# Patient Record
Sex: FEMALE | Race: OTHER | HISPANIC OR LATINO
[De-identification: names, ages, dates, MRNs, and addresses within clinical notes are randomized per-mention and may not be internally consistent; named-entity substitution may affect disease eponyms.]

---

## 2017-08-09 ENCOUNTER — TRANSCRIPTION ENCOUNTER (OUTPATIENT)
Age: 27
End: 2017-08-09

## 2017-08-13 ENCOUNTER — TRANSCRIPTION ENCOUNTER (OUTPATIENT)
Age: 27
End: 2017-08-13

## 2018-05-02 ENCOUNTER — TRANSCRIPTION ENCOUNTER (OUTPATIENT)
Age: 28
End: 2018-05-02

## 2018-11-19 ENCOUNTER — RESULT REVIEW (OUTPATIENT)
Age: 28
End: 2018-11-19

## 2019-10-04 ENCOUNTER — TRANSCRIPTION ENCOUNTER (OUTPATIENT)
Age: 29
End: 2019-10-04

## 2020-04-25 ENCOUNTER — MESSAGE (OUTPATIENT)
Age: 30
End: 2020-04-25

## 2020-05-04 LAB
SARS-COV-2 IGG SERPL IA-ACNC: 0 INDEX
SARS-COV-2 IGG SERPL QL IA: NEGATIVE

## 2020-07-09 ENCOUNTER — RESULT REVIEW (OUTPATIENT)
Age: 30
End: 2020-07-09

## 2020-09-29 ENCOUNTER — ASOB RESULT (OUTPATIENT)
Age: 30
End: 2020-09-29

## 2020-09-29 ENCOUNTER — APPOINTMENT (OUTPATIENT)
Dept: ANTEPARTUM | Facility: CLINIC | Age: 30
End: 2020-09-29
Payer: COMMERCIAL

## 2020-09-29 PROCEDURE — 76811 OB US DETAILED SNGL FETUS: CPT

## 2020-09-29 PROCEDURE — 76817 TRANSVAGINAL US OBSTETRIC: CPT

## 2021-01-28 ENCOUNTER — OUTPATIENT (OUTPATIENT)
Dept: OUTPATIENT SERVICES | Facility: HOSPITAL | Age: 31
LOS: 1 days | End: 2021-01-28
Payer: COMMERCIAL

## 2021-01-28 VITALS
OXYGEN SATURATION: 97 % | RESPIRATION RATE: 20 BRPM | DIASTOLIC BLOOD PRESSURE: 84 MMHG | WEIGHT: 147.93 LBS | HEART RATE: 64 BPM | HEIGHT: 60 IN | SYSTOLIC BLOOD PRESSURE: 122 MMHG | TEMPERATURE: 99 F

## 2021-01-28 DIAGNOSIS — K08.492 PARTIAL LOSS OF TEETH DUE TO OTHER SPECIFIED CAUSE, CLASS II: Chronic | ICD-10-CM

## 2021-01-28 DIAGNOSIS — Z29.9 ENCOUNTER FOR PROPHYLACTIC MEASURES, UNSPECIFIED: ICD-10-CM

## 2021-01-28 DIAGNOSIS — Z3A.37 37 WEEKS GESTATION OF PREGNANCY: ICD-10-CM

## 2021-01-28 LAB
BLD GP AB SCN SERPL QL: NEGATIVE — SIGNIFICANT CHANGE UP
HCT VFR BLD CALC: 42 % — SIGNIFICANT CHANGE UP (ref 34.5–45)
HGB BLD-MCNC: 13.8 G/DL — SIGNIFICANT CHANGE UP (ref 11.5–15.5)
MCHC RBC-ENTMCNC: 30 PG — SIGNIFICANT CHANGE UP (ref 27–34)
MCHC RBC-ENTMCNC: 32.9 GM/DL — SIGNIFICANT CHANGE UP (ref 32–36)
MCV RBC AUTO: 91.3 FL — SIGNIFICANT CHANGE UP (ref 80–100)
NRBC # BLD: 0 /100 WBCS — SIGNIFICANT CHANGE UP (ref 0–0)
PLATELET # BLD AUTO: 250 K/UL — SIGNIFICANT CHANGE UP (ref 150–400)
RBC # BLD: 4.6 M/UL — SIGNIFICANT CHANGE UP (ref 3.8–5.2)
RBC # FLD: 13.5 % — SIGNIFICANT CHANGE UP (ref 10.3–14.5)
RH IG SCN BLD-IMP: POSITIVE — SIGNIFICANT CHANGE UP
WBC # BLD: 12.04 K/UL — HIGH (ref 3.8–10.5)
WBC # FLD AUTO: 12.04 K/UL — HIGH (ref 3.8–10.5)

## 2021-01-28 PROCEDURE — 86850 RBC ANTIBODY SCREEN: CPT

## 2021-01-28 PROCEDURE — G0463: CPT

## 2021-01-28 PROCEDURE — 86900 BLOOD TYPING SEROLOGIC ABO: CPT

## 2021-01-28 PROCEDURE — 86901 BLOOD TYPING SEROLOGIC RH(D): CPT

## 2021-01-28 PROCEDURE — 85027 COMPLETE CBC AUTOMATED: CPT

## 2021-01-28 NOTE — OB PST NOTE - ASSESSMENT
CAPRINI SCORE [CLOT updated 18]    AGE RELATED RISK FACTORS                                                       MOBILITY RELATED FACTORS  [ ] Age 41-60 years                                            (1 Point)                    [ ] Bed rest                                                        (1 Point)  [ ] Age: 61-74 years                                           (2 Points)                  [ ] Plaster cast                                                   (2 Points)  [ ] Age= 75 years                                              (3 Points)                    [ ] Bed bound for more than 72 hours                 (2 Points)    DISEASE RELATED RISK FACTORS                                               GENDER SPECIFIC FACTORS  [ ] Edema in the lower extremities                       (1 Point)              [ 1] Pregnancy                                                     (1 Point)  [ ] Varicose veins                                               (1 Point)                     [ ] Post-partum < 6 weeks                                   (1 Point)             [ 1] BMI > 25 Kg/m2                                            (1 Point)                     [ ] Hormonal therapy  or oral contraception          (1 Point)                 [ ] Sepsis (in the previous month)                        (1 Point)               [ ] History of pregnancy complications                 (1 point)  [ ] Pneumonia or serious lung disease                                               [ ] Unexplained or recurrent                     (1 Point)           (in the previous month)                               (1 Point)  [ ] Abnormal pulmonary function test                     (1 Point)                 SURGERY RELATED RISK FACTORS  [ ] Acute myocardial infarction                              (1 Point)               [1 ]  Section                                             (1 Point)  [ ] Congestive heart failure (in the previous month)  (1 Point)      [ ] Minor surgery                                                  (1 Point)   [ ] Inflammatory bowel disease                             (1 Point)               [ ] Arthroscopic surgery                                        (2 Points)  [ ] Central venous access                                      (2 Points)                [ ] General surgery lasting more than 45 minutes (2 points)  [ ] Present or previous malignancy                     (2 Points)                [ ] Elective arthroplasty                                         (5 points)    [ ] Stroke (in the previous month)                          (5 Points)                                                                                                                                                           HEMATOLOGY RELATED FACTORS                                                 TRAUMA RELATED RISK FACTORS  [ ] Prior episodes of VTE                                     (3 Points)                [ ] Fracture of the hip, pelvis, or leg                       (5 Points)  [ ] Positive family history for VTE                         (3 Points)             [ ] Acute spinal cord injury (in the previous month)  (5 Points)  [ ] Prothrombin 59460 A                                     (3 Points)               [ ] Paralysis  (less than 1 month)                             (5 Points)  [ ] Factor V Leiden                                             (3 Points)                  [ ] Multiple Trauma within 1 month                        (5 Points)  [ ] Lupus anticoagulants                                     (3 Points)                                                           [ ] Anticardiolipin antibodies                               (3 Points)                                                       [ ] High homocysteine in the blood                      (3 Points)                                             [ ] Other congenital or acquired thrombophilia      (3 Points)                                                [ ] Heparin induced thrombocytopenia                  (3 Points)                                     Total Score [3 ]

## 2021-01-28 NOTE — OB PST NOTE - PMH
37 weeks gestation of pregnancy    Mild asthma without complication, unspecified whether persistent  childhood asthma   no previous intubation for asthma   has not used an inhaler since childhood  Scoliosis of lumbosacral spine, unspecified scoliosis type  mild

## 2021-01-28 NOTE — OB PST NOTE - HISTORY OF PRESENT ILLNESS
30 year old female , is being seen in preadmission today prior to scheduled  on 2021. Her medical history includes childhood asthma (no inhaler), mild scoliosis Patient is 37 weeks gestation, last abdominal sonogram on 2021.     Covid-19 swab on 2021

## 2021-01-28 NOTE — OB PST NOTE - FAMILY HISTORY
Mother  Still living? Yes, Estimated age: 61-70  Family history of hypertension in mother, Age at diagnosis: Age Unknown     Father  Still living? No  Family history of hypertension in father, Age at diagnosis: Age Unknown

## 2021-01-28 NOTE — OB PST NOTE - PROBLEM SELECTOR PLAN 2
scheduled for    both verbal and written preop instruction given  advise to practice social distancing, good handwashing and mask wearing   chlorhexidine wash to be used per protocol

## 2021-02-08 ENCOUNTER — TRANSCRIPTION ENCOUNTER (OUTPATIENT)
Age: 31
End: 2021-02-08

## 2021-02-09 ENCOUNTER — INPATIENT (INPATIENT)
Facility: HOSPITAL | Age: 31
LOS: 1 days | Discharge: HOME | End: 2021-02-11
Attending: OBSTETRICS & GYNECOLOGY | Admitting: OBSTETRICS & GYNECOLOGY
Payer: COMMERCIAL

## 2021-02-09 VITALS
WEIGHT: 149.03 LBS | TEMPERATURE: 98 F | OXYGEN SATURATION: 99 % | DIASTOLIC BLOOD PRESSURE: 83 MMHG | HEART RATE: 65 BPM | HEIGHT: 60 IN | SYSTOLIC BLOOD PRESSURE: 137 MMHG | RESPIRATION RATE: 18 BRPM

## 2021-02-09 DIAGNOSIS — O26.899 OTHER SPECIFIED PREGNANCY RELATED CONDITIONS, UNSPECIFIED TRIMESTER: ICD-10-CM

## 2021-02-09 DIAGNOSIS — Z3A.00 WEEKS OF GESTATION OF PREGNANCY NOT SPECIFIED: ICD-10-CM

## 2021-02-09 DIAGNOSIS — Z34.80 ENCOUNTER FOR SUPERVISION OF OTHER NORMAL PREGNANCY, UNSPECIFIED TRIMESTER: ICD-10-CM

## 2021-02-09 DIAGNOSIS — K08.492 PARTIAL LOSS OF TEETH DUE TO OTHER SPECIFIED CAUSE, CLASS II: Chronic | ICD-10-CM

## 2021-02-09 PROBLEM — M41.9 SCOLIOSIS, UNSPECIFIED: Chronic | Status: ACTIVE | Noted: 2021-01-28

## 2021-02-09 PROBLEM — J45.909 UNSPECIFIED ASTHMA, UNCOMPLICATED: Chronic | Status: ACTIVE | Noted: 2021-01-28

## 2021-02-09 PROBLEM — Z3A.37 37 WEEKS GESTATION OF PREGNANCY: Chronic | Status: ACTIVE | Noted: 2021-01-28

## 2021-02-09 LAB
BASOPHILS # BLD AUTO: 0.06 K/UL — SIGNIFICANT CHANGE UP (ref 0–0.2)
BASOPHILS NFR BLD AUTO: 0.5 % — SIGNIFICANT CHANGE UP (ref 0–2)
BLD GP AB SCN SERPL QL: NEGATIVE — SIGNIFICANT CHANGE UP
EOSINOPHIL # BLD AUTO: 0.06 K/UL — SIGNIFICANT CHANGE UP (ref 0–0.5)
EOSINOPHIL NFR BLD AUTO: 0.5 % — SIGNIFICANT CHANGE UP (ref 0–6)
HCT VFR BLD CALC: 43.5 % — SIGNIFICANT CHANGE UP (ref 34.5–45)
HGB BLD-MCNC: 14.8 G/DL — SIGNIFICANT CHANGE UP (ref 11.5–15.5)
IMM GRANULOCYTES NFR BLD AUTO: 0.7 % — SIGNIFICANT CHANGE UP (ref 0–1.5)
LYMPHOCYTES # BLD AUTO: 26.1 % — SIGNIFICANT CHANGE UP (ref 13–44)
LYMPHOCYTES # BLD AUTO: 3.24 K/UL — SIGNIFICANT CHANGE UP (ref 1–3.3)
MCHC RBC-ENTMCNC: 31 PG — SIGNIFICANT CHANGE UP (ref 27–34)
MCHC RBC-ENTMCNC: 34 GM/DL — SIGNIFICANT CHANGE UP (ref 32–36)
MCV RBC AUTO: 91 FL — SIGNIFICANT CHANGE UP (ref 80–100)
MONOCYTES # BLD AUTO: 0.52 K/UL — SIGNIFICANT CHANGE UP (ref 0–0.9)
MONOCYTES NFR BLD AUTO: 4.2 % — SIGNIFICANT CHANGE UP (ref 2–14)
NEUTROPHILS # BLD AUTO: 8.45 K/UL — HIGH (ref 1.8–7.4)
NEUTROPHILS NFR BLD AUTO: 68 % — SIGNIFICANT CHANGE UP (ref 43–77)
NRBC # BLD: 0 /100 WBCS — SIGNIFICANT CHANGE UP (ref 0–0)
PLATELET # BLD AUTO: 249 K/UL — SIGNIFICANT CHANGE UP (ref 150–400)
RBC # BLD: 4.78 M/UL — SIGNIFICANT CHANGE UP (ref 3.8–5.2)
RBC # FLD: 13.8 % — SIGNIFICANT CHANGE UP (ref 10.3–14.5)
RH IG SCN BLD-IMP: POSITIVE — SIGNIFICANT CHANGE UP
SARS-COV-2 RNA SPEC QL NAA+PROBE: SIGNIFICANT CHANGE UP
WBC # BLD: 12.42 K/UL — HIGH (ref 3.8–10.5)
WBC # FLD AUTO: 12.42 K/UL — HIGH (ref 3.8–10.5)

## 2021-02-09 PROCEDURE — 88307 TISSUE EXAM BY PATHOLOGIST: CPT | Mod: 26

## 2021-02-09 RX ORDER — IBUPROFEN 200 MG
600 TABLET ORAL EVERY 6 HOURS
Refills: 0 | Status: COMPLETED | OUTPATIENT
Start: 2021-02-09 | End: 2022-01-08

## 2021-02-09 RX ORDER — DIPHENHYDRAMINE HCL 50 MG
25 CAPSULE ORAL EVERY 6 HOURS
Refills: 0 | Status: DISCONTINUED | OUTPATIENT
Start: 2021-02-09 | End: 2021-02-11

## 2021-02-09 RX ORDER — METOCLOPRAMIDE HCL 10 MG
10 TABLET ORAL ONCE
Refills: 0 | Status: DISCONTINUED | OUTPATIENT
Start: 2021-02-09 | End: 2021-02-10

## 2021-02-09 RX ORDER — FAMOTIDINE 10 MG/ML
20 INJECTION INTRAVENOUS ONCE
Refills: 0 | Status: COMPLETED | OUTPATIENT
Start: 2021-02-09 | End: 2021-02-09

## 2021-02-09 RX ORDER — MAGNESIUM HYDROXIDE 400 MG/1
30 TABLET, CHEWABLE ORAL
Refills: 0 | Status: DISCONTINUED | OUTPATIENT
Start: 2021-02-09 | End: 2021-02-11

## 2021-02-09 RX ORDER — ONDANSETRON 8 MG/1
4 TABLET, FILM COATED ORAL EVERY 6 HOURS
Refills: 0 | Status: DISCONTINUED | OUTPATIENT
Start: 2021-02-09 | End: 2021-02-11

## 2021-02-09 RX ORDER — OXYCODONE HYDROCHLORIDE 5 MG/1
5 TABLET ORAL ONCE
Refills: 0 | Status: DISCONTINUED | OUTPATIENT
Start: 2021-02-09 | End: 2021-02-11

## 2021-02-09 RX ORDER — MORPHINE SULFATE 50 MG/1
0.1 CAPSULE, EXTENDED RELEASE ORAL ONCE
Refills: 0 | Status: DISCONTINUED | OUTPATIENT
Start: 2021-02-09 | End: 2021-02-10

## 2021-02-09 RX ORDER — DEXAMETHASONE 0.5 MG/5ML
4 ELIXIR ORAL EVERY 6 HOURS
Refills: 0 | Status: DISCONTINUED | OUTPATIENT
Start: 2021-02-09 | End: 2021-02-11

## 2021-02-09 RX ORDER — HEPARIN SODIUM 5000 [USP'U]/ML
5000 INJECTION INTRAVENOUS; SUBCUTANEOUS EVERY 12 HOURS
Refills: 0 | Status: DISCONTINUED | OUTPATIENT
Start: 2021-02-09 | End: 2021-02-11

## 2021-02-09 RX ORDER — NALOXONE HYDROCHLORIDE 4 MG/.1ML
0.1 SPRAY NASAL
Refills: 0 | Status: DISCONTINUED | OUTPATIENT
Start: 2021-02-09 | End: 2021-02-11

## 2021-02-09 RX ORDER — OXYCODONE HYDROCHLORIDE 5 MG/1
5 TABLET ORAL
Refills: 0 | Status: DISCONTINUED | OUTPATIENT
Start: 2021-02-09 | End: 2021-02-10

## 2021-02-09 RX ORDER — OXYCODONE HYDROCHLORIDE 5 MG/1
10 TABLET ORAL
Refills: 0 | Status: DISCONTINUED | OUTPATIENT
Start: 2021-02-09 | End: 2021-02-09

## 2021-02-09 RX ORDER — OXYTOCIN 10 UNIT/ML
333.33 VIAL (ML) INJECTION
Qty: 20 | Refills: 0 | Status: DISCONTINUED | OUTPATIENT
Start: 2021-02-09 | End: 2021-02-11

## 2021-02-09 RX ORDER — OXYCODONE HYDROCHLORIDE 5 MG/1
5 TABLET ORAL
Refills: 0 | Status: COMPLETED | OUTPATIENT
Start: 2021-02-09 | End: 2021-02-16

## 2021-02-09 RX ORDER — CITRIC ACID/SODIUM CITRATE 300-500 MG
30 SOLUTION, ORAL ORAL ONCE
Refills: 0 | Status: COMPLETED | OUTPATIENT
Start: 2021-02-09 | End: 2021-02-09

## 2021-02-09 RX ORDER — SODIUM CHLORIDE 9 MG/ML
1000 INJECTION, SOLUTION INTRAVENOUS
Refills: 0 | Status: DISCONTINUED | OUTPATIENT
Start: 2021-02-09 | End: 2021-02-09

## 2021-02-09 RX ORDER — SIMETHICONE 80 MG/1
80 TABLET, CHEWABLE ORAL EVERY 4 HOURS
Refills: 0 | Status: DISCONTINUED | OUTPATIENT
Start: 2021-02-09 | End: 2021-02-11

## 2021-02-09 RX ORDER — TETANUS TOXOID, REDUCED DIPHTHERIA TOXOID AND ACELLULAR PERTUSSIS VACCINE, ADSORBED 5; 2.5; 8; 8; 2.5 [IU]/.5ML; [IU]/.5ML; UG/.5ML; UG/.5ML; UG/.5ML
0.5 SUSPENSION INTRAMUSCULAR ONCE
Refills: 0 | Status: DISCONTINUED | OUTPATIENT
Start: 2021-02-09 | End: 2021-02-11

## 2021-02-09 RX ORDER — KETOROLAC TROMETHAMINE 30 MG/ML
30 SYRINGE (ML) INJECTION EVERY 6 HOURS
Refills: 0 | Status: DISCONTINUED | OUTPATIENT
Start: 2021-02-09 | End: 2021-02-10

## 2021-02-09 RX ORDER — SODIUM CHLORIDE 9 MG/ML
1000 INJECTION, SOLUTION INTRAVENOUS ONCE
Refills: 0 | Status: COMPLETED | OUTPATIENT
Start: 2021-02-09 | End: 2021-02-09

## 2021-02-09 RX ORDER — SODIUM CHLORIDE 9 MG/ML
1000 INJECTION, SOLUTION INTRAVENOUS
Refills: 0 | Status: DISCONTINUED | OUTPATIENT
Start: 2021-02-09 | End: 2021-02-11

## 2021-02-09 RX ORDER — OXYTOCIN 10 UNIT/ML
333.33 VIAL (ML) INJECTION
Qty: 20 | Refills: 0 | Status: COMPLETED | OUTPATIENT
Start: 2021-02-09 | End: 2021-02-09

## 2021-02-09 RX ORDER — ACETAMINOPHEN 500 MG
975 TABLET ORAL
Refills: 0 | Status: DISCONTINUED | OUTPATIENT
Start: 2021-02-09 | End: 2021-02-11

## 2021-02-09 RX ORDER — LANOLIN
1 OINTMENT (GRAM) TOPICAL EVERY 6 HOURS
Refills: 0 | Status: DISCONTINUED | OUTPATIENT
Start: 2021-02-09 | End: 2021-02-11

## 2021-02-09 RX ADMIN — SODIUM CHLORIDE 2000 MILLILITER(S): 9 INJECTION, SOLUTION INTRAVENOUS at 16:24

## 2021-02-09 RX ADMIN — Medication 1000 MILLIUNIT(S)/MIN: at 21:26

## 2021-02-09 RX ADMIN — Medication 975 MILLIGRAM(S): at 23:23

## 2021-02-09 RX ADMIN — FAMOTIDINE 20 MILLIGRAM(S): 10 INJECTION INTRAVENOUS at 19:18

## 2021-02-09 RX ADMIN — Medication 1000 MILLIUNIT(S)/MIN: at 20:50

## 2021-02-09 RX ADMIN — Medication 30 MILLIGRAM(S): at 20:35

## 2021-02-09 RX ADMIN — Medication 30 MILLILITER(S): at 19:18

## 2021-02-09 NOTE — OB PROVIDER H&P - ASSESSMENT
A/P: 31 yo  @ 38w5d presents for repeat c/s due to vaginal bleeding in setting of accessory placental lobe- not actively bleeding currently  - admit to L&D  - routine labs  - NPO  - IV hydration  - fetus: reactive NST  - anesthesia consult  - COVID swab collected      Dr Xena Bermudez, PAC

## 2021-02-09 NOTE — OB PROVIDER H&P - ATTENDING COMMENTS
Attending note    No more vaginal bleeding. +FM. NO LOF. Minor cramping. Here for CS for vaginal bleeding today. patient was told to come right from office appointment. Did not show up to hospital until the afternoon.     EFM: Cat I  TOCO: intermittent cramping    Plan:   For section  Routine labs  IVF  EFM/TOCO  Anesthesia consults  Consents signed- risks reviewed including bleeding, infection, damage to surrounding structures including but not limited to bowel, bladder and ureter. All questions answered    Elysia Mccallum DO

## 2021-02-09 NOTE — OB PROVIDER DELIVERY SUMMARY - NSPROVIDERDELIVERYNOTE_OBGYN_ALL_OB_FT
Live female infant, apgars 8/9, weight  Thin adhesions of bladder to anterior uterus  Omental adhesion to right side of peritoneum/muscle  Grossly normal appearing uterus, fallopian tubes and ovaries    IVF 1600   Live female infant, apgars 8/9, weight  Omental adhesion to right side of peritoneum/muscle  Grossly normal appearing uterus, fallopian tubes and ovaries    IVF 1600   Live female infant, apgars 8/9, weight 2636g  Omental adhesion to right side of peritoneum/muscle  Grossly normal appearing uterus, fallopian tubes and ovaries    IVF 1600

## 2021-02-09 NOTE — OB NEONATOLOGY/PEDIATRICIAN DELIVERY SUMMARY - NSPEDSNEONOTESA_OBGYN_ALL_OB_FT
38+5 wk female born via CS to a 29 y/o  blood type A+ mother. Maternal history of asthma and placental abruption (2015). No significant prenatal history. PNL -/-/NR/I, GBS -. AROM at  with clear fluids. Baby emerged vigorous, crying, was w/d/s/s with APGARS of 8/9. At approximately 3.5 minutes of life, O2 saturation was in the mid-60% range. Respiratory effort was fair. Patient was placed on CPAP 5/21% for approximately 1 minute. Max FiO2 50%. After stopping CPAP, O2 saturations and respiratory effort remained acceptable. Mom plans to initiate breastfeeding and consents Hep B vaccine. EOS 0.03.

## 2021-02-09 NOTE — OB PROVIDER H&P - HISTORY OF PRESENT ILLNESS
OB PA Admission Note    31 yo  @ 38w5d by EDC of  presents from office for repeat c/s secondary to vaginal bleeding in setting of accessory placental lobe. Pt reports she had soem vaginal bleeding this AM and very minimal spotting since. Pt reports mild occasional cramping.  +FM No LOF    PNC: accessory placental lobe  GBS neg  EFW [id number] //    PMH: childhood asthma (exacerbated by animals)  Meds: PNV  All: NKA  GYN: denies fibroids/cysts/STI/abn pap  OB: 5[birth year]  c/s @ 36 weeks secondary to placental tear [id number]  PSH: c/s x 1, wisdom teeth extraction  Social: denies toxic habits  Psych: denies history

## 2021-02-09 NOTE — OB PROVIDER H&P - NSHPPHYSICALEXAM_GEN_ALL_CORE
ICU Vital Signs Last 24 Hrs  T(C): 36.8 (09 Feb 2021 14:54), Max: 36.8 (09 Feb 2021 14:52)  T(F): 98.24 (09 Feb 2021 14:54), Max: 98.24 (09 Feb 2021 14:54)  HR: 59 (09 Feb 2021 15:29) (59 - 74)  BP: 137/83 (09 Feb 2021 14:54) (137/83 - 137/83)  BP(mean): --  ABP: --  ABP(mean): --  RR: 18 (09 Feb 2021 14:54) (18 - 18)  SpO2: 98% (09 Feb 2021 15:29) (97% - 100%)      gen: NAD  Heart: S1S2 RRR  Lungs: CTA b/l  Abd: gravid nontender  LE: no calf tenderness    FHT: reactive   Sublette: irregular

## 2021-02-09 NOTE — OB RN DELIVERY SUMMARY - NS_SEPSISRSKCALC_OBGYN_ALL_OB_FT
EOS calculated successfully. EOS Risk Factor: 0.5/1000 live births ([facility] national incidence); GA=38w5d; Temp=98.24; ROM=0; GBS='Negative'; Antibiotics='No antibiotics or any antibiotics < 2 hrs prior to birth'

## 2021-02-10 LAB
ALBUMIN SERPL ELPH-MCNC: 3.2 G/DL — LOW (ref 3.3–5)
ALP SERPL-CCNC: 146 U/L — HIGH (ref 40–120)
ALT FLD-CCNC: 13 U/L — SIGNIFICANT CHANGE UP (ref 10–45)
ANION GAP SERPL CALC-SCNC: 9 MMOL/L — SIGNIFICANT CHANGE UP (ref 5–17)
APTT BLD: 26.4 SEC — LOW (ref 27.5–35.5)
AST SERPL-CCNC: 21 U/L — SIGNIFICANT CHANGE UP (ref 10–40)
BASOPHILS # BLD AUTO: 0 K/UL — SIGNIFICANT CHANGE UP (ref 0–0.2)
BASOPHILS # BLD AUTO: 0.03 K/UL — SIGNIFICANT CHANGE UP (ref 0–0.2)
BASOPHILS NFR BLD AUTO: 0 % — SIGNIFICANT CHANGE UP (ref 0–2)
BASOPHILS NFR BLD AUTO: 0.2 % — SIGNIFICANT CHANGE UP (ref 0–2)
BILIRUB SERPL-MCNC: 0.3 MG/DL — SIGNIFICANT CHANGE UP (ref 0.2–1.2)
BUN SERPL-MCNC: 10 MG/DL — SIGNIFICANT CHANGE UP (ref 7–23)
CALCIUM SERPL-MCNC: 8.8 MG/DL — SIGNIFICANT CHANGE UP (ref 8.4–10.5)
CHLORIDE SERPL-SCNC: 100 MMOL/L — SIGNIFICANT CHANGE UP (ref 96–108)
CO2 SERPL-SCNC: 24 MMOL/L — SIGNIFICANT CHANGE UP (ref 22–31)
CREAT SERPL-MCNC: 0.67 MG/DL — SIGNIFICANT CHANGE UP (ref 0.5–1.3)
EOSINOPHIL # BLD AUTO: 0 K/UL — SIGNIFICANT CHANGE UP (ref 0–0.5)
EOSINOPHIL # BLD AUTO: 0.02 K/UL — SIGNIFICANT CHANGE UP (ref 0–0.5)
EOSINOPHIL NFR BLD AUTO: 0 % — SIGNIFICANT CHANGE UP (ref 0–6)
EOSINOPHIL NFR BLD AUTO: 0.1 % — SIGNIFICANT CHANGE UP (ref 0–6)
FIBRINOGEN PPP-MCNC: 652 MG/DL — HIGH (ref 290–520)
GLUCOSE SERPL-MCNC: 82 MG/DL — SIGNIFICANT CHANGE UP (ref 70–99)
HCT VFR BLD CALC: 37.4 % — SIGNIFICANT CHANGE UP (ref 34.5–45)
HCT VFR BLD CALC: 42 % — SIGNIFICANT CHANGE UP (ref 34.5–45)
HGB BLD-MCNC: 12.6 G/DL — SIGNIFICANT CHANGE UP (ref 11.5–15.5)
HGB BLD-MCNC: 14.1 G/DL — SIGNIFICANT CHANGE UP (ref 11.5–15.5)
IMM GRANULOCYTES NFR BLD AUTO: 0.6 % — SIGNIFICANT CHANGE UP (ref 0–1.5)
INR BLD: 0.9 RATIO — SIGNIFICANT CHANGE UP (ref 0.88–1.16)
LDH SERPL L TO P-CCNC: 233 U/L — SIGNIFICANT CHANGE UP (ref 50–242)
LYMPHOCYTES # BLD AUTO: 0.35 K/UL — LOW (ref 1–3.3)
LYMPHOCYTES # BLD AUTO: 1.7 % — LOW (ref 13–44)
LYMPHOCYTES # BLD AUTO: 1.92 K/UL — SIGNIFICANT CHANGE UP (ref 1–3.3)
LYMPHOCYTES # BLD AUTO: 10.8 % — LOW (ref 13–44)
MCHC RBC-ENTMCNC: 30.7 PG — SIGNIFICANT CHANGE UP (ref 27–34)
MCHC RBC-ENTMCNC: 30.9 PG — SIGNIFICANT CHANGE UP (ref 27–34)
MCHC RBC-ENTMCNC: 33.6 GM/DL — SIGNIFICANT CHANGE UP (ref 32–36)
MCHC RBC-ENTMCNC: 33.7 GM/DL — SIGNIFICANT CHANGE UP (ref 32–36)
MCV RBC AUTO: 91.5 FL — SIGNIFICANT CHANGE UP (ref 80–100)
MCV RBC AUTO: 91.7 FL — SIGNIFICANT CHANGE UP (ref 80–100)
MONOCYTES # BLD AUTO: 0.54 K/UL — SIGNIFICANT CHANGE UP (ref 0–0.9)
MONOCYTES # BLD AUTO: 0.82 K/UL — SIGNIFICANT CHANGE UP (ref 0–0.9)
MONOCYTES NFR BLD AUTO: 2.6 % — SIGNIFICANT CHANGE UP (ref 2–14)
MONOCYTES NFR BLD AUTO: 4.6 % — SIGNIFICANT CHANGE UP (ref 2–14)
NEUTROPHILS # BLD AUTO: 14.97 K/UL — HIGH (ref 1.8–7.4)
NEUTROPHILS # BLD AUTO: 19.85 K/UL — HIGH (ref 1.8–7.4)
NEUTROPHILS NFR BLD AUTO: 83.7 % — HIGH (ref 43–77)
NEUTROPHILS NFR BLD AUTO: 95.7 % — HIGH (ref 43–77)
NRBC # BLD: 0 /100 WBCS — SIGNIFICANT CHANGE UP (ref 0–0)
PLATELET # BLD AUTO: 203 K/UL — SIGNIFICANT CHANGE UP (ref 150–400)
PLATELET # BLD AUTO: 232 K/UL — SIGNIFICANT CHANGE UP (ref 150–400)
POTASSIUM SERPL-MCNC: 4.4 MMOL/L — SIGNIFICANT CHANGE UP (ref 3.5–5.3)
POTASSIUM SERPL-SCNC: 4.4 MMOL/L — SIGNIFICANT CHANGE UP (ref 3.5–5.3)
PROT SERPL-MCNC: 6.2 G/DL — SIGNIFICANT CHANGE UP (ref 6–8.3)
PROTHROM AB SERPL-ACNC: 10.8 SEC — SIGNIFICANT CHANGE UP (ref 10.6–13.6)
RBC # BLD: 4.08 M/UL — SIGNIFICANT CHANGE UP (ref 3.8–5.2)
RBC # BLD: 4.59 M/UL — SIGNIFICANT CHANGE UP (ref 3.8–5.2)
RBC # FLD: 13.6 % — SIGNIFICANT CHANGE UP (ref 10.3–14.5)
RBC # FLD: 13.6 % — SIGNIFICANT CHANGE UP (ref 10.3–14.5)
SARS-COV-2 IGG SERPL QL IA: NEGATIVE — SIGNIFICANT CHANGE UP
SARS-COV-2 IGM SERPL IA-ACNC: <0.1 INDEX — SIGNIFICANT CHANGE UP
SODIUM SERPL-SCNC: 133 MMOL/L — LOW (ref 135–145)
T PALLIDUM AB TITR SER: NEGATIVE — SIGNIFICANT CHANGE UP
URATE SERPL-MCNC: 4.6 MG/DL — SIGNIFICANT CHANGE UP (ref 2.5–7)
WBC # BLD: 17.86 K/UL — HIGH (ref 3.8–10.5)
WBC # BLD: 20.74 K/UL — HIGH (ref 3.8–10.5)
WBC # FLD AUTO: 17.86 K/UL — HIGH (ref 3.8–10.5)
WBC # FLD AUTO: 20.74 K/UL — HIGH (ref 3.8–10.5)

## 2021-02-10 RX ORDER — IBUPROFEN 200 MG
600 TABLET ORAL EVERY 6 HOURS
Refills: 0 | Status: DISCONTINUED | OUTPATIENT
Start: 2021-02-10 | End: 2021-02-11

## 2021-02-10 RX ORDER — KETOROLAC TROMETHAMINE 30 MG/ML
30 SYRINGE (ML) INJECTION ONCE
Refills: 0 | Status: DISCONTINUED | OUTPATIENT
Start: 2021-02-10 | End: 2021-02-10

## 2021-02-10 RX ADMIN — Medication 30 MILLIGRAM(S): at 15:33

## 2021-02-10 RX ADMIN — HEPARIN SODIUM 5000 UNIT(S): 5000 INJECTION INTRAVENOUS; SUBCUTANEOUS at 05:21

## 2021-02-10 RX ADMIN — Medication 975 MILLIGRAM(S): at 18:33

## 2021-02-10 RX ADMIN — OXYCODONE HYDROCHLORIDE 5 MILLIGRAM(S): 5 TABLET ORAL at 01:03

## 2021-02-10 RX ADMIN — Medication 30 MILLIGRAM(S): at 09:31

## 2021-02-10 RX ADMIN — Medication 975 MILLIGRAM(S): at 05:21

## 2021-02-10 RX ADMIN — HEPARIN SODIUM 5000 UNIT(S): 5000 INJECTION INTRAVENOUS; SUBCUTANEOUS at 18:34

## 2021-02-10 RX ADMIN — Medication 30 MILLIGRAM(S): at 03:08

## 2021-02-10 RX ADMIN — Medication 975 MILLIGRAM(S): at 12:38

## 2021-02-10 RX ADMIN — Medication 600 MILLIGRAM(S): at 20:56

## 2021-02-11 ENCOUNTER — TRANSCRIPTION ENCOUNTER (OUTPATIENT)
Age: 31
End: 2021-02-11

## 2021-02-11 VITALS
HEART RATE: 58 BPM | TEMPERATURE: 98 F | OXYGEN SATURATION: 96 % | RESPIRATION RATE: 18 BRPM | DIASTOLIC BLOOD PRESSURE: 66 MMHG | SYSTOLIC BLOOD PRESSURE: 116 MMHG

## 2021-02-11 PROCEDURE — 84550 ASSAY OF BLOOD/URIC ACID: CPT

## 2021-02-11 PROCEDURE — 59050 FETAL MONITOR W/REPORT: CPT

## 2021-02-11 PROCEDURE — 86769 SARS-COV-2 COVID-19 ANTIBODY: CPT

## 2021-02-11 PROCEDURE — G0463: CPT

## 2021-02-11 PROCEDURE — 85610 PROTHROMBIN TIME: CPT

## 2021-02-11 PROCEDURE — 88307 TISSUE EXAM BY PATHOLOGIST: CPT

## 2021-02-11 PROCEDURE — 85730 THROMBOPLASTIN TIME PARTIAL: CPT

## 2021-02-11 PROCEDURE — 80053 COMPREHEN METABOLIC PANEL: CPT

## 2021-02-11 PROCEDURE — 86850 RBC ANTIBODY SCREEN: CPT

## 2021-02-11 PROCEDURE — 86901 BLOOD TYPING SEROLOGIC RH(D): CPT

## 2021-02-11 PROCEDURE — 86900 BLOOD TYPING SEROLOGIC ABO: CPT

## 2021-02-11 PROCEDURE — 85384 FIBRINOGEN ACTIVITY: CPT

## 2021-02-11 PROCEDURE — 85025 COMPLETE CBC W/AUTO DIFF WBC: CPT

## 2021-02-11 PROCEDURE — 87635 SARS-COV-2 COVID-19 AMP PRB: CPT

## 2021-02-11 PROCEDURE — 59025 FETAL NON-STRESS TEST: CPT

## 2021-02-11 PROCEDURE — 83615 LACTATE (LD) (LDH) ENZYME: CPT

## 2021-02-11 PROCEDURE — 86780 TREPONEMA PALLIDUM: CPT

## 2021-02-11 PROCEDURE — U0005: CPT

## 2021-02-11 RX ORDER — IBUPROFEN 200 MG
1 TABLET ORAL
Qty: 0 | Refills: 0 | DISCHARGE
Start: 2021-02-11

## 2021-02-11 RX ORDER — ACETAMINOPHEN 500 MG
3 TABLET ORAL
Qty: 0 | Refills: 0 | DISCHARGE
Start: 2021-02-11

## 2021-02-11 RX ORDER — OXYCODONE HYDROCHLORIDE 5 MG/1
5 TABLET ORAL
Refills: 0 | Status: DISCONTINUED | OUTPATIENT
Start: 2021-02-11 | End: 2021-02-11

## 2021-02-11 RX ADMIN — Medication 600 MILLIGRAM(S): at 03:36

## 2021-02-11 RX ADMIN — Medication 975 MILLIGRAM(S): at 12:12

## 2021-02-11 RX ADMIN — Medication 975 MILLIGRAM(S): at 05:59

## 2021-02-11 RX ADMIN — OXYCODONE HYDROCHLORIDE 5 MILLIGRAM(S): 5 TABLET ORAL at 00:30

## 2021-02-11 RX ADMIN — Medication 600 MILLIGRAM(S): at 09:23

## 2021-02-11 RX ADMIN — Medication 975 MILLIGRAM(S): at 00:30

## 2021-02-11 RX ADMIN — OXYCODONE HYDROCHLORIDE 5 MILLIGRAM(S): 5 TABLET ORAL at 09:26

## 2021-02-11 RX ADMIN — OXYCODONE HYDROCHLORIDE 5 MILLIGRAM(S): 5 TABLET ORAL at 03:36

## 2021-02-11 RX ADMIN — HEPARIN SODIUM 5000 UNIT(S): 5000 INJECTION INTRAVENOUS; SUBCUTANEOUS at 06:00

## 2021-02-11 RX ADMIN — OXYCODONE HYDROCHLORIDE 5 MILLIGRAM(S): 5 TABLET ORAL at 12:12

## 2021-02-11 NOTE — DISCHARGE NOTE OB - PATIENT PORTAL LINK FT
You can access the FollowMyHealth Patient Portal offered by [facility] by registering at the following website: [email or web address]. By joining [employer]’s FollowMyHealth portal, you will also be able to view your health information using other applications (apps) compatible with our system.

## 2021-02-11 NOTE — PROGRESS NOTE ADULT - SUBJECTIVE AND OBJECTIVE BOX
Day 1 of Anesthesia Pain Management Service    SUBJECTIVE: Doing ok  Pain Scale Score:          [X] Refer to charted pain scores    THERAPY:    s/p  100   mcg PF morphine on 2\9\2021      MEDICATIONS  (STANDING):  acetaminophen   Tablet .. 975 milliGRAM(s) Oral <User Schedule>  diphtheria/tetanus/pertussis (acellular) Vaccine (ADAcel) 0.5 milliLiter(s) IntraMuscular once  heparin   Injectable 5000 Unit(s) SubCutaneous every 12 hours  ibuprofen  Tablet. 600 milliGRAM(s) Oral every 6 hours  lactated ringers. 1000 milliLiter(s) (125 mL/Hr) IV Continuous <Continuous>  morphine PF Spinal 0.1 milliGRAM(s) IntraThecal. once  oxytocin Infusion 333.333 milliUNIT(s)/Min (1000 mL/Hr) IV Continuous <Continuous>    MEDICATIONS  (PRN):  dexAMETHasone  Injectable 4 milliGRAM(s) IV Push every 6 hours PRN Nausea  diphenhydrAMINE 25 milliGRAM(s) Oral every 6 hours PRN Pruritus  lanolin Ointment 1 Application(s) Topical every 6 hours PRN Sore Nipples  magnesium hydroxide Suspension 30 milliLiter(s) Oral two times a day PRN Constipation  naloxone Injectable 0.1 milliGRAM(s) IV Push every 3 minutes PRN For ANY of the following changes in patient status:  A. Breaths Per Minute LESS THAN 10, B. Oxygen saturation LESS THAN 90%, C. Sedation score of 6 for Stop After: 4 Times  ondansetron Injectable 4 milliGRAM(s) IV Push every 6 hours PRN Nausea  oxyCODONE    IR 5 milliGRAM(s) Oral every 3 hours PRN Moderate to Severe Pain (4-10)  oxyCODONE    IR 5 milliGRAM(s) Oral once PRN Moderate to Severe Pain (4-10)  oxyCODONE    IR 5 milliGRAM(s) Oral every 3 hours PRN Mild Pain (1 - 3)  oxyCODONE    IR 10 milliGRAM(s) Oral every 3 hours PRN Moderate Pain (4 - 6)  simethicone 80 milliGRAM(s) Chew every 4 hours PRN Gas      OBJECTIVE:    Sedation:        	[X] Alert	 [ ] Drowsy	[ ] Arousable      [ ] Asleep       [ ] Unresponsive    Side Effects:	[  ] None 	[ ] Nausea	[ ] Vomiting         [X ] Pruritus  		[ ] Weakness            [ ] Numbness	          [ ] Other:    Vital Signs Last 24 Hrs  T(C): 36.6 (10 Feb 2021 08:56), Max: 37.1 (10 Feb 2021 00:50)  T(F): 97.9 (10 Feb 2021 08:56), Max: 98.8 (10 Feb 2021 00:50)  HR: 62 (10 Feb 2021 08:56) (53 - 74)  BP: 127/80 (10 Feb 2021 08:56) (108/59 - 158/84)  BP(mean): 109 (09 Feb 2021 23:30) (78 - 109)  RR: 20 (10 Feb 2021 08:56) (12 - 21)  SpO2: 98% (10 Feb 2021 08:56) (97% - 100%)    ASSESSMENT/ PLAN  [X] Patient to be transitioned to prn analgesics later today  [X] Pain management per primary service, pain service to sign off   [X]Documentation and Verification of current medications
Postpartum Note,  Section   ATTENDING NOTE Post-operative day 2    Subjective:  The patient feels well.  She is ambulating.   She is tolerating regular diet.  She denies nausea and vomiting.  She is voiding.  Her pain is controlled.  She reports normal postpartum bleeding    Physical exam:    Vital Signs Last 24 Hrs  T(C): 36.6 (2021 05:12), Max: 36.9 (10 Feb 2021 13:30)  T(F): 97.8 (2021 05:12), Max: 98.4 (10 Feb 2021 13:30)  HR: 58 (2021 05:12) (58 - 91)  BP: 116/66 (2021 05:12) (98/63 - 128/84)  BP(mean): --  RR: 18 (2021 05:12) (16 - 18)  SpO2: 96% (2021 05:12) (96% - 98%)    Gen: NAD  Breast: Soft, nontender, not engorged.  Abdomen: Soft, nontender, no distension , firm uterine fundus at umbilicus.  Incision: Clean, dry, and intact  Pelvic: Normal lochia noted  Ext: No calf tenderness    LABS:                        12.6   17.86 )-----------( 203      ( 10 Feb 2021 06:06 )             37.4                         14.1   20.74 )-----------( 232      ( 10 Feb 2021 00:02 )             42.0                         14.8   12.42 )-----------( 249      ( 2021 16:15 )             43.5     ABO Interpretation: A (21 @ 16:18)  Rh Interpretation: Positive (21 @ 16:18)    Antibody ScreenNegative    02-10-21 @ 00:02      133<L>  |  100  |  10  ----------------------------<  82  4.4   |  24  |  0.67        Ca    8.8      10 Feb 2021 00:02    TPro  6.2  /  Alb  3.2<L>  /  TBili  0.3  /  DBili  x   /  AST  21  /  ALT  13  /  AlkPhos  146<H>  02-10-21 @ 00:02        Allergies    No Known Allergies    Intolerances      MEDICATIONS  (STANDING):  acetaminophen   Tablet .. 975 milliGRAM(s) Oral <User Schedule>  diphtheria/tetanus/pertussis (acellular) Vaccine (ADAcel) 0.5 milliLiter(s) IntraMuscular once  heparin   Injectable 5000 Unit(s) SubCutaneous every 12 hours  ibuprofen  Tablet. 600 milliGRAM(s) Oral every 6 hours  lactated ringers. 1000 milliLiter(s) (125 mL/Hr) IV Continuous <Continuous>  oxytocin Infusion 333.333 milliUNIT(s)/Min (1000 mL/Hr) IV Continuous <Continuous>    MEDICATIONS  (PRN):  dexAMETHasone  Injectable 4 milliGRAM(s) IV Push every 6 hours PRN Nausea  diphenhydrAMINE 25 milliGRAM(s) Oral every 6 hours PRN Pruritus  lanolin Ointment 1 Application(s) Topical every 6 hours PRN Sore Nipples  magnesium hydroxide Suspension 30 milliLiter(s) Oral two times a day PRN Constipation  naloxone Injectable 0.1 milliGRAM(s) IV Push every 3 minutes PRN For ANY of the following changes in patient status:  A. Breaths Per Minute LESS THAN 10, B. Oxygen saturation LESS THAN 90%, C. Sedation score of 6 for Stop After: 4 Times  ondansetron Injectable 4 milliGRAM(s) IV Push every 6 hours PRN Nausea  oxyCODONE    IR 5 milliGRAM(s) Oral once PRN Moderate to Severe Pain (4-10)  oxyCODONE    IR 5 milliGRAM(s) Oral every 3 hours PRN Moderate to Severe Pain (4-10)  simethicone 80 milliGRAM(s) Chew every 4 hours PRN Gas        Assessment and Plan  POD # 2  s/p  section. Stable.  Encourage ambulation  Analgesia prn  Regular diet as tolerated      Office [phone]  Dr. Rivera                
Postpartum Note-  Section POD#2    Allergies: No Known Allergies    Subjective: Patient w/o complaints, pain is controlled.  Pt is OOB, tolerating PO, passing flatus, and voiding. Lochia WNL.     O:  Vital Signs Last 24 Hrs  T(C): 36.6 (2021 05:12), Max: 36.9 (10 Feb 2021 13:30)  T(F): 97.8 (2021 05:12), Max: 98.4 (10 Feb 2021 13:30)  HR: 58 (2021 05:12) (58 - 91)  BP: 116/66 (2021 05:12) (98/63 - 128/84)  BP(mean): --  RR: 18 (2021 05:12) (16 - 20)  SpO2: 96% (2021 05:12) (96% - 98%)      Gen: NAD  Heart: S1S2 RRR  Lungs: CTA b/l  Abdomen: Soft, nontender, non distended, fundus firm.  Incision: Clean, dry, and intact.  Negative erythema/edema/ecchymosis.   SubQ  Lochia WNL  Ext: Neg edema, Neg calf tenderness    LABS:               12.6   17.86 )-----------( 203      ( 02-10 @ 06:06 )             37.4                14.1   20.74 )-----------( 232      ( 02-10 @ 00:02 )             42.0                14.8   12.42 )-----------( 249      (  @ 16:15 )             43.5           PMHx: c/s, childhood asthma, mild scoliosis  Current Issues: none          
Pain Management Attending Addendum    SUBJECTIVE:    Therapy:	  PCA	   Epidural           s/p Spinal Opoid      x        Postpartum infusion	  Patient controlled regional anesthesia (PCRA)    prn Analgesics    OBJECTIVE: No new signsx      Other:    Side Effects:    None	x		 Other:    Assessment of Catheter Site:		 Intact		 Other:    ASSESSMENT/PLAN  Continue current therapyx    Therapy changed to:     IV PCA        Epidural      prn Analgesics     post partum infusion    Comments:
Postpartum Note,  Section   ATTENDING NOTE - Post-operative day 1    Subjective:  The patient feels well. Ambulating without difficulty  Pt is tolerating regular diet. Pain well controlled.  She denies nausea and vomiting.  Sandoval d/c and patient voided    She reports normal postpartum bleeding    Physical exam:    Vital Signs Last 24 Hrs  T(C): 36.6 (10 Feb 2021 08:56), Max: 37.1 (10 Feb 2021 00:50)  T(F): 97.9 (10 Feb 2021 08:56), Max: 98.8 (10 Feb 2021 00:50)  HR: 62 (10 Feb 2021 08:56) (53 - 74)  BP: 127/80 (10 Feb 2021 08:56) (108/59 - 158/84)  BP(mean): 109 (2021 23:30) (78 - 109)  RR: 20 (10 Feb 2021 08:56) (12 - 21)  SpO2: 98% (10 Feb 2021 08:56) (97% - 100%)    Gen: NAD  Breast: Soft, nontender, not engorged.  Abdomen: Soft, nontender, no distension , firm uterine fundus at umbilicus -2.  Incision: dark dry blood on steris, dry, and intact   Ext: No calf tenderness, no hyper reflexia, ( x )trace       LABS:                        12.6   17.86 )-----------( 203      ( 10 Feb 2021 06:06 )             37.4                         14.1   20.74 )-----------( 232      ( 10 Feb 2021 00:02 )             42.0                         14.8   12.42 )-----------( 249      ( 2021 16:15 )             43.5     Antibody Screen: Negative ( @ 16:18)  ABO Interpretation: A ( @ 16:18)  Rh Interpretation: Positive ( @ 16:18)    02-10-21 @ 00:02      133<L>  |  100  |  10  ----------------------------<  82  4.4   |  24  |  0.67        Ca    8.8      10 Feb 2021 00:02    TPro  6.2  /  Alb  3.2<L>  /  TBili  0.3  /  DBili  x   /  AST  21  /  ALT  13  /  AlkPhos  146<H>  02-10-21 @ 00:02        Allergies    No Known Allergies    Intolerances      MEDICATIONS  (STANDING):  acetaminophen   Tablet .. 975 milliGRAM(s) Oral <User Schedule>  diphtheria/tetanus/pertussis (acellular) Vaccine (ADAcel) 0.5 milliLiter(s) IntraMuscular once  heparin   Injectable 5000 Unit(s) SubCutaneous every 12 hours  ibuprofen  Tablet. 600 milliGRAM(s) Oral every 6 hours  lactated ringers. 1000 milliLiter(s) (125 mL/Hr) IV Continuous <Continuous>  morphine PF Spinal 0.1 milliGRAM(s) IntraThecal. once  oxytocin Infusion 333.333 milliUNIT(s)/Min (1000 mL/Hr) IV Continuous <Continuous>    MEDICATIONS  (PRN):  dexAMETHasone  Injectable 4 milliGRAM(s) IV Push every 6 hours PRN Nausea  diphenhydrAMINE 25 milliGRAM(s) Oral every 6 hours PRN Pruritus  lanolin Ointment 1 Application(s) Topical every 6 hours PRN Sore Nipples  magnesium hydroxide Suspension 30 milliLiter(s) Oral two times a day PRN Constipation  naloxone Injectable 0.1 milliGRAM(s) IV Push every 3 minutes PRN For ANY of the following changes in patient status:  A. Breaths Per Minute LESS THAN 10, B. Oxygen saturation LESS THAN 90%, C. Sedation score of 6 for Stop After: 4 Times  ondansetron Injectable 4 milliGRAM(s) IV Push every 6 hours PRN Nausea  oxyCODONE    IR 5 milliGRAM(s) Oral every 3 hours PRN Moderate to Severe Pain (4-10)  oxyCODONE    IR 5 milliGRAM(s) Oral once PRN Moderate to Severe Pain (4-10)  oxyCODONE    IR 5 milliGRAM(s) Oral every 3 hours PRN Mild Pain (1 - 3)  oxyCODONE    IR 10 milliGRAM(s) Oral every 3 hours PRN Moderate Pain (4 - 6)  simethicone 80 milliGRAM(s) Chew every 4 hours PRN Gas        Assessment and Plan        Office [phone]  Dr. Dodge                  
PA POD # 1 C/S Note    Blood Type:   A Positive      Rubella:  Immune            RPR:  NR    Pain:  Controlled  Complaints:  None  Pt. is ambulating, DTV, passing flatus, & tolerating regular diet.  Pt. had elevated BP's post-operatively for which HELLP labs were sent which were nl and BP this a.m. 119/72.  Pt. denies HA/Visual Changes/Epigastric Distress.  Lochia:  WNL    VS:  T(C): 36.7 (02-10-21 @ 05:00), Max: 37.1 (02-10-21 @ 00:50)  HR: 60 (02-10-21 @ 05:00) (53 - 74)  BP: 119/72 (02-10-21 @ 05:00) (108/59 - 158/84)  RR: 18 (02-10-21 @ 05:00) (12 - 21)  SpO2: 97% (02-10-21 @ 05:00) (97% - 100%)                        12.6   17.86 )-----------( 203      ( 10 Feb 2021 06:06 )             37.4     Complete Blood Count + Automated Diff (02.10.21 @ 00:02)    WBC Count: 20.74 K/uL    Hemoglobin: 14.1 g/dL    Hematocrit: 42.0 %    Platelet Count - Automated: 232 K/uL     02-10    133<L>  |  100  |  10  ----------------------------<  82  4.4   |  24  |  0.67    Ca    8.8      10 Feb 2021 00:02    TPro  6.2  /  Alb  3.2<L>  /  TBili  0.3  /  DBili  x   /  AST  21  /  ALT  13  /  AlkPhos  146<H>  02-10    Abd:  Soft, non-distended, non-tender            Fundus-firm            Incision- C/D/I-SQ; Dressing removed  Extremities:  Edema - none                     Calf Tenderness - none    Assessment:    30 y.o. G 4  P 2022      POD # 1 S/P Rpt. C/S  in stable condition.    PMHx significant for:  C/s, Childhood Asthma, Mild Scoliosis  Current Issues:  None  Plan:  Increase OOB             Cont. Pain Protocol             CBC?Chemistry as above             Cont. Reg. Diet             Cont. PO Care.            Cont. DVT PPx    DENISE Moore

## 2021-02-11 NOTE — DISCHARGE NOTE OB - MATERIALS PROVIDED
[facility] [place] Screening Program/Breastfeeding Log/Breastfeeding Mother’s Support Group Information/Guide to Postpartum Care/[facility] Hearing Screen Program/Back To Sleep Handout/Shaken Baby Prevention Handout/Breastfeeding Guide and Packet/Birth Certificate Instructions

## 2021-02-11 NOTE — DISCHARGE NOTE OB - CARE PROVIDER_API CALL
Luis A Rivera)  Obstetrics and Gynecology  [address] - [address]  [place] [address]  Phone: [phone]  Fax: [phone]  Follow Up Time:

## 2021-02-11 NOTE — DISCHARGE NOTE OB - CARE PROVIDERS DIRECT ADDRESSES
[email or web address].[facility]

## 2021-02-11 NOTE — DISCHARGE NOTE OB - MEDICATION SUMMARY - MEDICATIONS TO TAKE
I will START or STAY ON the medications listed below when I get home from the hospital:    acetaminophen 325 mg oral tablet  -- 3 tab(s) by mouth   -- Indication: For mlld to moderate pain    ibuprofen 600 mg oral tablet  -- 1 tab(s) by mouth every 6 hours  -- Indication: For mild to moderate pain    Prenatal Multivitamins with Folic Acid 1 mg oral tablet  -- 1 tab(s) by mouth once a day  -- Indication: For Supplement

## 2021-02-11 NOTE — DISCHARGE NOTE OB - CARE PLAN
Principal Discharge DX:	 delivery delivered  Goal:	return to normal activities over the next 5-6 weeks  Assessment and plan of treatment:	regular diet;  limited physical and sexual activities for 5-6 weeks;  to office in 2 weeks

## 2021-02-11 NOTE — PROGRESS NOTE ADULT - PROBLEM SELECTOR PLAN 1
POD # 1  s/p  section. Stable.  Encourage ambulation  po pain meds  Regular diet as tolerated  CBC reviewed-increased WBC expected
Cont. PP/PO Care
Increase OOB  PO Pain Protocol  Continue Regular Diet  Continue routine prenatal care  Discharge Planning

## 2021-03-14 LAB — SURGICAL PATHOLOGY STUDY: SIGNIFICANT CHANGE UP

## 2021-05-17 ENCOUNTER — TRANSCRIPTION ENCOUNTER (OUTPATIENT)
Age: 31
End: 2021-05-17

## 2021-07-15 ENCOUNTER — NON-APPOINTMENT (OUTPATIENT)
Age: 31
End: 2021-07-15

## 2021-07-16 ENCOUNTER — NON-APPOINTMENT (OUTPATIENT)
Age: 31
End: 2021-07-16

## 2021-07-16 ENCOUNTER — APPOINTMENT (OUTPATIENT)
Dept: FAMILY MEDICINE | Facility: CLINIC | Age: 31
End: 2021-07-16
Payer: COMMERCIAL

## 2021-07-16 VITALS
OXYGEN SATURATION: 100 % | WEIGHT: 125 LBS | TEMPERATURE: 98.7 F | BODY MASS INDEX: 24.54 KG/M2 | DIASTOLIC BLOOD PRESSURE: 76 MMHG | SYSTOLIC BLOOD PRESSURE: 123 MMHG | HEIGHT: 60 IN | HEART RATE: 57 BPM | RESPIRATION RATE: 14 BRPM

## 2021-07-16 DIAGNOSIS — Z13.0 ENCOUNTER FOR SCREENING FOR DISEASES OF THE BLOOD AND BLOOD-FORMING ORGANS AND CERTAIN DISORDERS INVOLVING THE IMMUNE MECHANISM: ICD-10-CM

## 2021-07-16 DIAGNOSIS — Z76.89 PERSONS ENCOUNTERING HEALTH SERVICES IN OTHER SPECIFIED CIRCUMSTANCES: ICD-10-CM

## 2021-07-16 DIAGNOSIS — Z11.59 ENCOUNTER FOR SCREENING FOR OTHER VIRAL DISEASES: ICD-10-CM

## 2021-07-16 DIAGNOSIS — Z13.29 ENCOUNTER FOR SCREENING FOR OTHER SUSPECTED ENDOCRINE DISORDER: ICD-10-CM

## 2021-07-16 DIAGNOSIS — Z13.220 ENCOUNTER FOR SCREENING FOR LIPOID DISORDERS: ICD-10-CM

## 2021-07-16 DIAGNOSIS — Z13.1 ENCOUNTER FOR SCREENING FOR DIABETES MELLITUS: ICD-10-CM

## 2021-07-16 DIAGNOSIS — Z82.49 FAMILY HISTORY OF ISCHEMIC HEART DISEASE AND OTHER DISEASES OF THE CIRCULATORY SYSTEM: ICD-10-CM

## 2021-07-16 DIAGNOSIS — Z78.9 OTHER SPECIFIED HEALTH STATUS: ICD-10-CM

## 2021-07-16 DIAGNOSIS — Z11.1 ENCOUNTER FOR SCREENING FOR RESPIRATORY TUBERCULOSIS: ICD-10-CM

## 2021-07-16 DIAGNOSIS — Z23 ENCOUNTER FOR IMMUNIZATION: ICD-10-CM

## 2021-07-16 PROCEDURE — G0444 DEPRESSION SCREEN ANNUAL: CPT | Mod: 59

## 2021-07-16 PROCEDURE — 99072 ADDL SUPL MATRL&STAF TM PHE: CPT

## 2021-07-16 PROCEDURE — 99204 OFFICE O/P NEW MOD 45 MIN: CPT | Mod: 25

## 2021-07-16 PROCEDURE — 36415 COLL VENOUS BLD VENIPUNCTURE: CPT

## 2021-07-20 PROBLEM — Z76.89 ENCOUNTER TO ESTABLISH CARE: Status: ACTIVE | Noted: 2021-07-16

## 2021-07-20 PROBLEM — Z13.29 SCREENING FOR HYPOTHYROIDISM: Status: ACTIVE | Noted: 2021-07-16

## 2021-07-20 PROBLEM — Z13.220 SCREENING FOR LIPID DISORDERS: Status: ACTIVE | Noted: 2021-07-16

## 2021-07-20 PROBLEM — Z11.59 ENCOUNTER FOR HEPATITIS C SCREENING TEST FOR LOW RISK PATIENT: Status: ACTIVE | Noted: 2021-07-16

## 2021-07-20 PROBLEM — Z13.1 SCREENING FOR DIABETES MELLITUS: Status: ACTIVE | Noted: 2021-07-16

## 2021-07-20 PROBLEM — Z11.1 SCREENING FOR TUBERCULOSIS: Status: ACTIVE | Noted: 2021-07-16

## 2021-07-20 PROBLEM — Z11.59 NEED FOR HEPATITIS B SCREENING TEST: Status: ACTIVE | Noted: 2021-07-16

## 2021-07-20 PROBLEM — Z78.9 MEASLES, MUMPS, RUBELLA (MMR) VACCINATION STATUS UNKNOWN: Status: ACTIVE | Noted: 2021-07-16

## 2021-07-20 PROBLEM — Z13.0 SCREENING FOR DEFICIENCY ANEMIA: Status: ACTIVE | Noted: 2021-07-16

## 2021-07-20 LAB
25(OH)D3 SERPL-MCNC: 31.7 NG/ML
ALBUMIN SERPL ELPH-MCNC: 4.5 G/DL
ALP BLD-CCNC: 44 U/L
ALT SERPL-CCNC: 23 U/L
ANION GAP SERPL CALC-SCNC: 13 MMOL/L
APPEARANCE: CLEAR
AST SERPL-CCNC: 16 U/L
B BURGDOR AB SER-IMP: NEGATIVE
B BURGDOR IGG+IGM SER QL IB: NORMAL
B BURGDOR IGG+IGM SER QL: 0.14 INDEX
BACTERIA: NEGATIVE
BASOPHILS # BLD AUTO: 0.06 K/UL
BASOPHILS NFR BLD AUTO: 0.6 %
BILIRUB SERPL-MCNC: 0.6 MG/DL
BILIRUBIN URINE: NEGATIVE
BLOOD URINE: NEGATIVE
BUN SERPL-MCNC: 13 MG/DL
CALCIUM SERPL-MCNC: 9.6 MG/DL
CHLORIDE SERPL-SCNC: 103 MMOL/L
CHOLEST SERPL-MCNC: 149 MG/DL
CO2 SERPL-SCNC: 22 MMOL/L
COLOR: NORMAL
CREAT SERPL-MCNC: 0.83 MG/DL
EOSINOPHIL # BLD AUTO: 0.29 K/UL
EOSINOPHIL NFR BLD AUTO: 2.8 %
ESTIMATED AVERAGE GLUCOSE: 103 MG/DL
FERRITIN SERPL-MCNC: 41 NG/ML
FOLATE SERPL-MCNC: 14.2 NG/ML
GLUCOSE QUALITATIVE U: NEGATIVE
GLUCOSE SERPL-MCNC: 87 MG/DL
HBA1C MFR BLD HPLC: 5.2 %
HBV SURFACE AB SER QL: REACTIVE
HCT VFR BLD CALC: 45.2 %
HCV AB SER QL: NONREACTIVE
HCV S/CO RATIO: 0.16 S/CO
HDLC SERPL-MCNC: 66 MG/DL
HGB BLD-MCNC: 14.4 G/DL
HYALINE CASTS: 0 /LPF
IMM GRANULOCYTES NFR BLD AUTO: 0.2 %
IRON SATN MFR SERPL: 36 %
IRON SERPL-MCNC: 111 UG/DL
KETONES URINE: ABNORMAL
LDLC SERPL CALC-MCNC: 75 MG/DL
LEUKOCYTE ESTERASE URINE: NEGATIVE
LYMPHOCYTES # BLD AUTO: 3.66 K/UL
LYMPHOCYTES NFR BLD AUTO: 35.7 %
MAGNESIUM SERPL-MCNC: 2 MG/DL
MAN DIFF?: NORMAL
MCHC RBC-ENTMCNC: 30 PG
MCHC RBC-ENTMCNC: 31.9 GM/DL
MCV RBC AUTO: 94.2 FL
MEV IGG FLD QL IA: >300 AU/ML
MEV IGG+IGM SER-IMP: POSITIVE
MICROSCOPIC-UA: NORMAL
MONOCYTES # BLD AUTO: 0.4 K/UL
MONOCYTES NFR BLD AUTO: 3.9 %
MUV AB SER-ACNC: POSITIVE
MUV IGG SER QL IA: 22.4 AU/ML
NEUTROPHILS # BLD AUTO: 5.82 K/UL
NEUTROPHILS NFR BLD AUTO: 56.8 %
NITRITE URINE: NEGATIVE
NONHDLC SERPL-MCNC: 83 MG/DL
PH URINE: 6
PLATELET # BLD AUTO: 334 K/UL
POTASSIUM SERPL-SCNC: 4.1 MMOL/L
PROT SERPL-MCNC: 6.9 G/DL
PROTEIN URINE: NEGATIVE
RBC # BLD: 4.8 M/UL
RBC # FLD: 14.1 %
RED BLOOD CELLS URINE: 1 /HPF
RUBV IGG FLD-ACNC: 1.2 INDEX
RUBV IGG SER-IMP: POSITIVE
SODIUM SERPL-SCNC: 138 MMOL/L
SPECIFIC GRAVITY URINE: >=1.03
SQUAMOUS EPITHELIAL CELLS: 0 /HPF
T3 SERPL-MCNC: 86 NG/DL
T4 SERPL-MCNC: 5.2 UG/DL
TIBC SERPL-MCNC: 311 UG/DL
TRIGL SERPL-MCNC: 40 MG/DL
TSH SERPL-ACNC: 1.07 UIU/ML
UIBC SERPL-MCNC: 199 UG/DL
UROBILINOGEN URINE: NORMAL
VIT B12 SERPL-MCNC: 734 PG/ML
VZV AB TITR SER: POSITIVE
VZV IGG SER IF-ACNC: 768.2 INDEX
WBC # FLD AUTO: 10.25 K/UL
WHITE BLOOD CELLS URINE: 1 /HPF

## 2021-07-21 LAB
M TB IFN-G BLD-IMP: NEGATIVE
QUANTIFERON TB PLUS MITOGEN MINUS NIL: >10 IU/ML
QUANTIFERON TB PLUS NIL: 0.05 IU/ML
QUANTIFERON TB PLUS TB1 MINUS NIL: 0.19 IU/ML
QUANTIFERON TB PLUS TB2 MINUS NIL: 0.12 IU/ML

## 2022-05-03 ENCOUNTER — NON-APPOINTMENT (OUTPATIENT)
Age: 32
End: 2022-05-03

## 2024-04-08 NOTE — OB PST NOTE - PROBLEM SELECTOR PROBLEM 2
4/8/2024       RE: Shayne Shoemaker  [id number] [facility] [address]     Dear Colleague,    Thank you for referring your patient, Shayne Shoemaker, to the [facility] INFECTIOUS DISEASE CLINIC [place] at [facility]. Please see a copy of my visit note below.    Virtual Visit Details    Type of service:  Video Visit   Video Start Time: 2:28 PM  Video End Time:2:41 PM  Originating Location (pt. Location): Home    Distant Location (provider location):  On-site  Platform used for Video Visit: [facility]  Transplant Infectious Disease Clinic Note:  Follow Up     Patient:  Shayne Shoemaker, Date of birth 1964, Medical record number [id number]  Date of Visit:  04/08/2024         Assessment and Recommendations:   Recommendations:  Continue treatment for SAMREEN infection  - Azithromycin 500 mg once daily (increased from 500 mg 3x weekly on 5/24/23)  - Ethambutol 1,600 mg once daily (16.9 mg/kg) (changed from 2,400 mg 3x weekly on 5/24/23)  - Rifabutin 300 mg once daily (decreased from 300 mg/day to 150 mg/day 6/22 due to cytopenias. Increased back up to 300 mg/day on 12/19/23).   - Arikayce neb three times per week (uses in the morning) - started the week of 6/12/23, held between 1/12 - 1/24/24, reduced from daily to three times per week from 3/6/24  Plan to obtain one more AFB sputum culture with next BAL  If any of the additional sputum/BAL cultures positive, will plan to add Clofazimine as an extra (5th agent)  Optimization of immunosuppression and optimizing airway clearance per Transplant team  Next Chest CT to be scheduled for ~ 5/2024  Continue follow up with Ophthalmology (he is on Ethambutol)   Continue follow up with ENT (he is on Arikayce) - if worsening hearing at any time, will plan to stop Arikayce  No indication to treat Aspergillus and Fusarium isolated on respiratory cultures at this time  ID  follow up every 2 months with f/up with MTM Pharmacist in the months in-between    Assessment:  59 year old male s/p bilateral lung transplant for IPF on 6/17/18, bilateral anastomotic stenosis s/p bronchs with left current stent placement, who is being evaluated for M.gordonae seen on respiratory cultures    #Tree-in-bud nodularity on chest imaging:  #Pulmonary M.avium infection - treatment failure:  Cough and wheezing with decline in PFTs in 7/2022-9/2022. Chronic tree in bud opacities on CT chest, stable. SAMREEN first isolated 8/2022  Started on 3 drug regimen - Rifabutin, Ethambutol and Azithromycin M/W/F although he was taking Rifabutin daily for first 6 weeks. Reported improvement of cough and shortness of breath. BAL cultures from 1/6/23 negative  After hospital admission from COVID diagnosis in 2/2023, Chest CT repeated which showed increased tree-in-bud nodules B/L along with new multifocal GGOs, B/L upper lobes. Non-invasive fungal workup negative, repeat bronchoscopy performed 4/6/23. Unfortunately, AFB cultures positive for M.avium complex again (still retained susceptibility to Macrolides however slightly higher MICs)  Persistent culture positivity over 6 months into treatment concerning for treatment failure. Reassuring that Macrolide susceptibility retained.   Switched to daily administration of 3 NTM meds starting 5/24/23. According to IDSA guidelines, liposomal Amikacin (Arikayce) added 6/2023. After cytopenias, Rifabutin dose reduced to 150mg daily. Now tolerating 4 drug regimen well. Slight improvement in symptoms in the summer, but stable since. Last PFTs show 100 ml improvement. CT from 8/24/23 shows some apical nodularity improvement, rest stable. Unfortunately, sputum culture from 10/24/23 still with positivity at 3 weeks incubation (although smears negative). AFB cultures from 12/8/23 first negative. Reassuring that BAL cultures from 1/12/24, 1/25/24 and 2/27/24 are also negative. Plan to obtain  cultures with next BAL  Current regimen:  - Azithromycin 500 mg once daily (increased from 500 mg 3x weekly on 5/24)  - Ethambutol 1,600 mg once daily (16.9 mg/kg) (changed from 2,400 mg 3x weekly on 5/24)  - Rifabutin 300 mg once daily (decreased from 300 mg/day to 150 mg/day 6/22 due to cytopenias. Increased back up to 300 mg/day on 12/19).   - Arikayce neb three times per week (uses in the morning) - started the week of 6/12/23, held between 1/12 - 1/24, reduced to three times per week from 3/6/24    #Acute respiratory failure with hypoxia  #Mucous plugging  Was feeling well post-bronch with LMB stent exchange on 1/12, did note increased sputum production following procedure. He held amikacin from 1/12 onward. Now s/p bronch on 1/25 with large mucous plug removed, improvement in oxygen needs after this.     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 No concerning lesions on recent bronchs (1/12, 1/25). Notes increased secretions since bronch on 1/12/24. CT with stable tree-in-bud opacities. Likely colonizing organisms    #CMV infection vs viral shedding  D-/R+, BAL CMV level 5700 copies (1/12), prior 404 (4/6/23).  Serum CMV negative on 1/3 (last positive on 4/6/23 at low level). Received Valganciclovir at ppx dosing x 4 weeks     #Prolonged QTc   Baseline QTc ~500-510 and stable on current regimen. Attention to this when adding potentially QT prolonging medications.       Other Infectious Disease issues include:  - COVID infection: 2/3/23, Remdesivir 2/3 - 2/5/23. Symptoms persisted, admitted and received 5 day Remdesivir course 2/25 - 3/1/23. 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   - Hx of + serum Histoplasma antigen testing on 4/6/22, although the urine Histoplasma antigen on the same day was  negative. At the time, with lack of a clear alternative etiology to explain tree-in-bud nodularity, he was started on Itraconazole. However, he only took the medication for a month (length of original prescription). Latest urine Histo antigen 2 months off treatment was negative, indicating that perhaps his serum test was a false positive and/or not the explanation for the nodules.   - Hx of M. gordonae isolated from his respiratory tract on one occasion in BAL culture from 12/22/2021. Previous BALs have failed to show this organism. Chest CT showed some tree-in-bud nodularity however this had been present for several months if not longer, when this organism was not isolated. M.gordonae is an environmental organism and is generally the least pathogenic of the NTM; when isolated in cultures, it is commonly regarded to be a colonizer. Would not specifically target this organism at this time  - Hx of Pseudomonas on respiratory cultures (bronch) from 11/12/21. Was on Michael nebs 28 days on and off for suppression to 4/6/2022.   - Hx of pulmonary aspergillus infection (A. fumigatus grew from BAL cultures 12/2020). At the time, serum Aspergillus GM was negative, beta-d-glucan positive at 292. Treated with 3 months of Voriconazole (therapeutic levels between 1.2 - 2.4).  - Possible CMV infection - CMV VL of 69k on bronch from 12/2021. Previously noted to have GGOs on Chest CT 11/2021 but had resolved by the time a repeat Chest CT was performed in 12/2021. Serum CMV VLs remained negative. Was treated with 6 weeks of Valcyte  - QTc interval: 457 msec 6/2/23  - Pneumocystis prophylaxis: Dapsone  - Serostatus & viral prophylaxis: CMV -/+, EBV -/+  - Immunization status: Influenza and other vaccinations: completed covid vaccine series; flu shot; already received Evusheld  - Gamma globulin status: 781 on 8/8/23  - Isolation status:  Good hand hygiene, standard isolation precautions    I spent 45 mins on date of encounter between  clinic visit, documentation, review of chart/labs/imaging and care coordination     OSCAR Guthrie  Staff Physician, Infectious Diseases  Pager [phone]        Interval History:   Last seen in ID clinic 2/2024  No ER visits or hospital admissions since    2/8/24 and 4/3/24 - Optometrist visit - visual field and optic nerve exam normal - no e/o ethambutol toxicity both eyes  2/7/24 - Audiology visit - results reveal normal to moderate sensorineural hearing loss in the right ear and normal to moderately-severe largely sensorineural hearing loss in the left ear     On 3/6/24 - after meeting with [facility] pharmacist, Arikayce dosing reduced to three times per week    Pulm txp visit today - feeling better overall with hypertonic saline nebs and vesting - produces mucus, bringing up more secretions and is able to get it moving  Denies congestion or wheezing  PFTs today showed improvement, per patient  No supplemental O2 needed    Ringing in ears - notices it only if very quiet. Has had some issues with hearing, plans to get fitted for hearing aids, appointment in May. Ringing has never kept him up. Overall stable  No other new side effects/symptoms    Transplants:  6/17/2018 (Lung); Postoperative day:  2122.  Coordinator Butch Gonzalez    Review of Systems:  Remaining systems reviewed and negative    Immunization History   Administered Date(s) Administered    COVID-19 12+ (2023-24) (MODERNA) 10/12/2023    COVID-19 Monovalent 18+ (Moderna) 02/25/2021, 03/26/2021, 08/27/2021, 02/07/2022    Flu, Unspecified 11/18/2020    Influenza (IIV3) PF 11/30/2006, 10/24/2013    Influenza Vaccine 18-64 (Flublok) 10/22/2019, 11/18/2020, 10/27/2021, 10/27/2022    Influenza Vaccine >6 months,quad, PF 10/24/2017, 10/10/2018    Pneumo Conj 13-V (2010&after) 01/25/2018    Pneumococcal 23 valent 05/28/2019    TDAP (Adacel,Boostrix) 05/03/2022    Tdap (Adult) Unspecified Formulation 02/01/2012    Twinrix A/B 01/25/2018, 05/03/2018     Zoster recombinant adjuvanted (SHINGRIX) 05/28/2019, 10/22/2019       No Known Allergies           Physical Exam:     Wt Readings from Last 4 Encounters:   04/08/24 106.4 kg (234 lb 9.6 oz)   02/27/24 107 kg (236 lb)   02/05/24 104.3 kg (230 lb)   01/24/24 102.1 kg (225 lb)     Exam: Limited exam as visit was conducted via [email or web address]  GENERAL: well-developed, well-nourished, alert, oriented, in no acute distress.  HEAD: Head is normocephalic, atraumatic   EYES: Eyes have anicteric sclerae.    LUNGS: On room air, no use of accessory muscles  NEUROLOGIC: AAO x 3         Laboratory Data:     Inflammatory Markers    Recent Labs   Lab Test 02/09/18  1221   SED 19   CRP 27.2*       Immune Globulin Studies     Recent Labs   Lab Test 01/25/24  0630 08/08/23  1043 02/25/23  1707 08/09/22  1243 07/07/22  0839 01/15/21  0812 06/16/18  1308 04/30/18  0856 02/09/18  1221    781 571* 627 531* 675 1,170 1,130 964   IGM  --   --  60  --   --   --   --  123  --    IGE  --   --  6  --   --   --   --  82  --    IGA  --   --  144  --   --   --   --  513*  --    IGG1  --   --   --   --   --   --   --  456 390   IGG2  --   --   --   --   --   --   --  415 424   IGG3  --   --   --   --   --   --   --  326* 197*   IGG4  --   --   --   --   --   --   --  30 21       Metabolic Studies    Recent Labs   Lab Test 04/08/24  0758 02/27/24  1107 01/29/24  0852 03/20/23  0919 03/14/23  1241 03/10/23  0845 03/03/23  0622 03/02/23  1432 02/26/23  1610 02/26/23  0701    141 142   < > 140  --    < >  --    < > 141   POTASSIUM 4.5 4.2 4.0   < > 4.2  --    < >  --    < > 3.6   CHLORIDE 109* 106 109*   < > 103 107   < >  --    < > 109*   CO2 25 24 21*   < > 24  --    < >  --    < > 17*   ANIONGAP 10 11 12   < > 13  --    < >  --    < > 15   BUN 24.5* 18.7 21.0   < > 23.8*  --    < >  --    < > 13.5   CR 1.54* 1.43* 1.41*   < > 1.25*  --    < >  --    < > 1.44*   63394  --   --   --   --   --  1.23  --   --   --   --    GFRESTIMATED 51* 56* 57*    < > 66  --    < >  --    < > 56*   GLC 86 86 81   < > 93  --    < >  --    < > 94   LACIE 8.9 9.0 9.2   < > 9.6  --    < >  --    < > 7.7*   PHOS  --   --   --   --  2.9  --    < >  --    < > 1.7*   MAG 1.9 1.8 1.8   < > 2.2  --    < >  --    < > 1.5*   LACT  --   --   --   --   --   --   --  1.4  --   --    CKT  --   --   --   --   --   --   --   --   --  83    < > = values in this interval not displayed.       Hepatic Studies    Recent Labs   Lab Test 02/27/24  1107 07/03/23  0908 06/21/23  0757   BILITOTAL 0.4 0.4 0.4   DBIL  --  <0.20 <0.20   ALKPHOS 52 56 59   PROTTOTAL 7.0 7.0 6.9   ALBUMIN 4.6 4.4 4.3   AST 30 31 25   ALT 19 18 17       Hematology Studies   Recent Labs   Lab Test 04/08/24  0758 02/27/24  1107 01/29/24  0852 01/26/24  0537 01/25/24  0630 01/24/24  0409 01/03/24  1056 03/14/23  1241 03/10/23  0845 05/09/21  0923 05/02/21  1057 04/21/21  0810   WBC 6.6 5.5 6.3 13.2*   < > 7.4 4.3   < >  --    < > 4.4 3.6*   70747  --   --   --   --   --   --   --   --  5.4   < >  --   --    ANEU  --   --   --   --   --   --   --   --   --   --  2.5 1.7   ANEUTAUTO  --   --   --   --   --  5.2 2.1   < >  --    < >  --   --    ALYM  --   --   --   --   --   --   --   --   --   --  1.1 1.0   ALYMPAUTO  --   --   --   --   --  1.2 1.4   < >  --    < >  --   --    EVA  --   --   --   --   --   --   --   --   --   --  0.7 0.8   AMONOAUTO  --   --   --   --   --  0.9 0.8   < >  --    < >  --   --    AEOS  --   --   --   --   --   --   --   --   --   --  0.1 0.1   AEOSAUTO  --   --   --   --   --  0.1 0.1   < >  --    < >  --   --    ABSBASO  --   --   --   --   --  0.1 0.0   < >  --    < >  --   --    HGB 13.0* 13.1* 12.5* 13.2*   < > 14.1 12.9*   < >  --    < > 12.5* 13.1*   68381  --   --   --   --   --   --   --   --  12.1*   < >  --   --    HCT 40.4 39.1* 38.1* 39.4*   < > 42.4 39.2*   < >  --    < > 38.2* 40.0   * 156 138* 132*   < > 154 109*   < >  --    < > 145* 160   57406  --   --   --   --   --   --    --   --  167   < >  --   --     < > = values in this interval not displayed.     Medication levels    Recent Labs   Lab Test 02/27/24  1107 01/25/24  0630 01/24/24  0409 01/27/21  0901 01/15/21  0812 06/20/18  0402 06/19/18  0338   VANCOMYCIN  --   --   --   --   --   --  16.0   VCON  --   --   --   --  2.4   < >  --    TACROL 8.0   < >  --    < > 13.0   < > 21.8*   MPACID  --   --  1.22  --   --   --   --    MPAG  --   --  53.4  --   --   --   --     < > = values in this interval not displayed.     Microbiology:  Last Culture results with specimen source  Culture   Date Value Ref Range Status   01/25/2024 Fusarium species (A)  Final   01/25/2024 3+ Normal jim  Final   01/25/2024 1+ Aspergillus calidoustus/ustus (A)  Corrected     Comment:       Corrected result: Previously reported as Aspergillus fumigatus on 1/28/2024 at 12:45 PM CST.   01/12/2024 No Actinomyces isolated  Final   01/12/2024 No Growth  Final   01/12/2024 Fusarium species (A)  Final   01/12/2024 3+ Normal jim  Final   01/12/2024 1+ Aspergillus fumigatus complex (A)  Final   04/06/2023 2+ Actinomyces odontolyticus (A)  Final     Comment:     Susceptibilities not routinely done, refer to antibiogram to view typical susceptibility profilesThis organism is part of normal jim, but on occasion may be a true pathogen. Clinical correlation must be applied to interpreting this result.   04/06/2023 4+ Normal jim  Final   04/06/2023 Aspergillus nidulans (A)  Final   04/06/2023 Penicillium species (A)  Final   04/06/2023 3+ Normal jim  Final   02/25/2023 3+ Normal jim  Final   02/25/2023   Final    >10 Squamous epithelial cells/low power field indicates oral contamination. Please recollect.   02/25/2023 No Growth  Final   02/24/2023 No Growth  Final   02/24/2023 No Growth  Final   01/06/2023 No Actinomyces isolated  Final   01/06/2023 No Growth  Final   01/06/2023 No Growth  Final   01/06/2023 4+ Normal jim  Final     Culture Micro   Date Value  Ref Range Status   02/04/2021   Final    No Actinomyces species isolated  Since this specimen was not transported in the proper anaerobic transport media, the   absence of anaerobes in this culture does not rule out the presence of anaerobes in this   specimen.     02/04/2021 Culture negative for acid fast bacilli  Final   02/04/2021   Final    Assayed at [facility] [address] [place] [address] [phone]   02/04/2021 Culture negative after 4 weeks  Final   02/04/2021 No growth after 4 weeks  Final   02/04/2021 (A)  Final    Light growth  Staphylococcus epidermidis  Susceptibility testing not routinely done     12/30/2020 Culture negative for acid fast bacilli  Final   12/30/2020   Final    Assayed at [facility], [address] [place] [address] [phone]   12/30/2020 Aspergillus fumigatus  isolated   (A)  Final   12/30/2020   Final    No additional fungus isolated after 6 days incubation   12/30/2020 Unable to hold 4 weeks due to overgrowth of fungus  Final   12/30/2020 Light growth  Normal respiratory jim    Final   12/30/2020 Light growth  Aspergillus fumigatus   (A)  Final         Fungal testing  Recent Labs   Lab Test 01/17/24  1025 01/12/24  0811 04/06/23  0742 03/24/23  0950 02/28/23  1458 02/25/23  1707 08/09/22  1243 04/06/22  1021 12/30/20  2226   FGTL 53  --   --  47  --  40 <31  --  292   FGTLI Negative  --   --  Negative  --  Negative Negative  --  Positive*   ASPGAI 0.10 0.11 0.24 0.09  --  0.07 0.03 0.04 0.05   ASPAG  --  Negative Negative  --   --   --   --   --   --    ASPGAA Negative  --   --  Negative  --  Negative Negative Negative Negative   COFUNG  --   --   --   --  <1:2  --   --   --   --    FUNBL  --   --   --   --  0.9  --   --   --   --        Beta D Glucan levels (Fungitell assay)    (1,3)-Beta-D-Glucan   Date Value Ref Range Status   01/17/2024 53 pg/mL Final   03/24/2023 47 pg/mL Final   02/25/2023 40 pg/mL Final   08/09/2022 <31 pg/mL Final    12/30/2020 292 pg/mL Final        Virology:  Coronavirus-19 testing    Recent Labs   Lab Test 09/12/22  0817 02/01/21  1110 11/20/20  1326 11/07/20  1330 10/26/20  0706 10/12/20  1024   [id number]  --  Nasopharyngeal  --   --   --   --    [id number]  --  Nasopharyngeal Nasopharyngeal Nasopharyngeal Nasopharyngeal  --    COVIDPCREXT Negative  --   --   --   --  Undetected       Respiratory virus (non-coronavirus-19) testing    Recent Labs   Lab Test 01/24/24  0410 02/25/23  0009 12/22/21  0816 12/22/21  0816 02/04/21  0752   RVSPEC  --   --   --   --  Bronchial   IFLUA  --  Not Detected   < > Negative Negative   INFZA Negative Negative   < >  --   --    FLUAH1  --  Not Detected   < > Negative Negative   WH0354  --  Not Detected   < > Negative Negative   FLUAH3  --  Not Detected   < > Negative Negative   IFLUB  --  Not Detected   < > Negative Negative   INFZB Negative Negative   < >  --   --    PIV1  --  Not Detected  --  Negative Negative   PIV2  --  Not Detected  --  Negative Negative   PIV3  --  Not Detected  --  Negative Negative   PIV4  --  Not Detected  --   --   --    IRSV Negative Negative   < >  --   --    HRVS  --   --   --  Negative Negative   RSVA  --  Not Detected   < > Negative Negative   RSVB  --  Not Detected   < > Negative Negative   HMPV  --  Not Detected  --  Negative Negative   ADVBE  --   --   --  Negative Negative   ADVC  --   --   --  Negative Negative   ADENOV  --  Not Detected  --   --   --    CORONA  --  Not Detected  --   --   --     < > = values in this interval not displayed.       CMV viral loads    Recent Labs   Lab Test 04/06/23  0742 12/22/21  0816 05/09/21  0923 05/02/21  1057 03/31/21  1152 02/14/21  1023 02/04/21  0752 01/21/20  1048 11/12/19  0944 10/31/19  0937 03/27/19  1219 03/08/19  0911 01/24/19  1039 01/21/19  0830 01/15/19  0613 12/10/18  0937   CSPEC  --   --  EDTA PLASMA EDTA PLASMA Plasma Blood Bronchial lavage   < >  --   --    < >  --    < >  --    < >  --     CMVRESINST 404* 69,109*  --   --   --   --   --   --   --   --   --   --   --   --   --   --    CMVLOG 2.6 4.8 Not Calculated Not Calculated Not Calculated Not Calculated 3.4*   < >  --   --    < >  --    < >  --    < >  --    99321  --   --   --   --   --   --   --   --  Negative Negative  --  Undetected  --  Undetected  --  Undetected    < > = values in this interval not displayed.       CMV viral loads    CMV DNA IU/mL, Instrument   Date Value Ref Range Status   04/06/2023 404 (H) <1 IU/mL Final   12/22/2021 69,109 (H) <1 IU/mL Final     Log IU/mL of CMVQNT   Date Value Ref Range Status   05/09/2021 Not Calculated <2.1 [Log_IU]/mL Final   05/02/2021 Not Calculated <2.1 [Log_IU]/mL Final   03/31/2021 Not Calculated <2.1 [Log_IU]/mL Final   02/14/2021 Not Calculated <2.1 [Log_IU]/mL Final   02/04/2021 3.4 (H) <2.1 [Log_IU]/mL Final   01/27/2021 Not Calculated <2.1 [Log_IU]/mL Final   12/29/2020 Not Calculated <2.1 [Log_IU]/mL Final   11/18/2020 Not Calculated <2.1 [Log_IU]/mL Final   10/29/2020 Not Calculated <2.1 [Log_IU]/mL Final   10/26/2020 Not Calculated <2.1 [Log_IU]/mL Final   07/15/2020 Not Calculated <2.1 [Log_IU]/mL Final   04/21/2020 Not Calculated <2.1 [Log_IU]/mL Final   01/21/2020 Not Calculated <2.1 [Log_IU]/mL Final   10/22/2019 Not Calculated <2.1 [Log_IU]/mL Final   07/23/2019 Not Calculated <2.1 [Log_IU]/mL Final   05/29/2019 Not Calculated <2.1 [Log_IU]/mL Final   05/28/2019 Not Calculated <2.1 [Log_IU]/mL Final   03/28/2019 Not Calculated <2.1 [Log_IU]/mL Final   03/27/2019 Not Calculated <2.1 [Log_IU]/mL Final   02/26/2019 Not Calculated <2.1 [Log_IU]/mL Final   02/12/2019 Not Calculated <2.1 [Log_IU]/mL Final   01/24/2019 Not Calculated <2.1 [Log_IU]/mL Final   01/15/2019 Not Calculated <2.1 [Log_IU]/mL Final   12/04/2018 Not Calculated <2.1 [Log_IU]/mL Final   11/15/2018 Not Calculated <2.1 [Log_IU]/mL Final   11/15/2018 Not Calculated <2.1 [Log_IU]/mL Final   11/06/2018 Not Calculated  <2.1 [Log_IU]/mL Final   10/02/2018 Not Calculated <2.1 [Log_IU]/mL Final   09/12/2018 Not Calculated <2.1 [Log_IU]/mL Final   09/11/2018 Not Calculated <2.1 [Log_IU]/mL Final     CMV log   Date Value Ref Range Status   04/06/2023 2.6  Final   12/22/2021 4.8  Final     CMV DNA Quant (External)   Date Value Ref Range Status   11/12/2019 Negative Negative IU/mL Final   10/31/2019 Negative Negative IU/mL Final   03/08/2019 Undetected Undetected IU/mL Final   01/21/2019 Undetected Undetected IU/mL Final   12/10/2018 Undetected Undetected IU/mL Final       EBV DNA Copies/mL   Date Value Ref Range Status   11/29/2023 Not Detected Not Detected copies/mL Final   08/08/2023 Not Detected Not Detected copies/mL Final   06/12/2023 Not Detected Not Detected copies/mL Final   06/01/2023 Not Detected Not Detected copies/mL Final   02/16/2023 Not Detected Not Detected copies/mL Final   01/04/2023 Not Detected Not Detected copies/mL Final   07/07/2022 Not Detected Not Detected copies/mL Final   10/26/2020 EBV DNA Not Detected EBVNEG^EBV DNA Not Detected [Copies]/mL Final     Hepatitis B Testing     Recent Labs   Lab Test 06/16/18  1308 04/30/18  0856   AUSAB 0.00 0.55   HBCAB Nonreactive Nonreactive   HEPBANG Nonreactive Nonreactive   HBQRES HBV DNA Not Detected  --        Hepatitis C Antibody   Date Value Ref Range Status   04/30/2018 Nonreactive NR^Nonreactive Final     Comment:     Assay performance characteristics have not been established for newborns,   infants, and children         CMV Antibody IgG   Date Value Ref Range Status   06/16/2018 >8.0 (H) 0.0 - 0.8 AI Final     Comment:     Positive  Antibody index (AI) values reflect qualitative changes in antibody   concentration that cannot be directly associated with clinical condition or   disease state.     04/30/2018 >8.0 (H) 0.0 - 0.8 AI Final     Comment:     Positive  Antibody index (AI) values reflect qualitative changes in antibody   concentration that cannot be  directly associated with clinical condition or   disease state.       Varicella Zoster Virus Antibody IgG   Date Value Ref Range Status   04/30/2018 3.6 (H) 0.0 - 0.8 AI Final     Comment:     Positive, suggests prev. exposure and probable immunity  Antibody index (AI) values reflect qualitative changes in antibody   concentration that cannot be directly associated with clinical condition or   disease state.       EBV Capsid Antibody IgG   Date Value Ref Range Status   06/16/2018 >8.0 (H) 0.0 - 0.8 AI Final     Comment:     Positive, suggests recent or past exposure  Antibody index (AI) values reflect qualitative changes in antibody   concentration that cannot be directly associated with clinical condition or   disease state.     04/30/2018 7.5 (H) 0.0 - 0.8 AI Final     Comment:     Positive, suggests recent or past exposure  Antibody index (AI) values reflect qualitative changes in antibody   concentration that cannot be directly associated with clinical condition or   disease state.       Toxoplasma Antibody IgG   Date Value Ref Range Status   04/30/2018 47.9 (H) 0.0 - 7.1 IU/mL Final     Comment:     Positive-Presence of detectable Toxoplasma gondii IgG antivodies. A positive   result generally indicates either recent or past exposure to the pathogen.  The magnitude of the measured result is not indicative of the amount of   antibody present. The concentrations of anti-Toxoplasma gondii IgG in a given   specimen determined with assays from different manufacturers can vary due to   differences in assay methods and reagent specificity.       Herpes Simplex Virus Type 1 IgG   Date Value Ref Range Status   06/16/2018 1.2 (H) 0.0 - 0.8 AI Final     Comment:     Positive.  IgG antibody to HSV-1 detected.  Antibody index (AI) values reflect qualitative changes in antibody   concentration that cannot be directly associated with clinical condition or   disease state.     04/30/2018 1.0 (H) 0.0 - 0.8 AI Final     Comment:      Equivocal, please recollect.  Antibody index (AI) values reflect qualitative changes in antibody   concentration that cannot be directly associated with clinical condition or   disease state.       Herpes Simplex Virus Type 2 IgG   Date Value Ref Range Status   06/16/2018 <0.2 0.0 - 0.8 AI Final     Comment:     No HSV-2 IgG antibodies detected.  Antibody index (AI) values reflect qualitative changes in antibody   concentration that cannot be directly associated with clinical condition or   disease state.     04/30/2018 <0.2 0.0 - 0.8 AI Final     Comment:     No HSV-2 IgG antibodies detected.  Antibody index (AI) values reflect qualitative changes in antibody   concentration that cannot be directly associated with clinical condition or   disease state.       Imaging:  CXR 4/8/24:  Impression: Postsurgical changes of bilateral lung transplantation,  largely unchanged compared to 2/27/2024. No acute airspace disease.    CT Chest PE 1/24/24:  IMPRESSION:  1.  No pulmonary embolism.  2.  Stable tree-in-bud appearance in the bilateral lungs as described. These likely represent a chronic process. Clinical correlation for acute symptomatology recommended.    CT Chest 8/24/23:  IMPRESSION:   1. Previously seen 4 mm pleural-based nodular opacity has decreased in size, likely representing atelectasis. Decreased patchy nodularity in the left upper lobe as well. This may represented previous inflammatory/infectious process which has moderately improved.  2. Other scattered persistent tree-in-bud nodularity is grossly similar to prior.        Morro Orourke MD     37 weeks gestation of pregnancy

## 2025-07-14 ENCOUNTER — NON-APPOINTMENT (OUTPATIENT)
Age: 35
End: 2025-07-14